# Patient Record
Sex: FEMALE
[De-identification: names, ages, dates, MRNs, and addresses within clinical notes are randomized per-mention and may not be internally consistent; named-entity substitution may affect disease eponyms.]

---

## 2019-03-12 ENCOUNTER — HOSPITAL ENCOUNTER (OUTPATIENT)
Dept: HOSPITAL 5 - CT | Age: 59
Discharge: HOME | End: 2019-03-12
Attending: INTERNAL MEDICINE
Payer: COMMERCIAL

## 2019-03-12 DIAGNOSIS — I70.0: ICD-10-CM

## 2019-03-12 DIAGNOSIS — K76.0: ICD-10-CM

## 2019-03-12 DIAGNOSIS — I71.4: Primary | ICD-10-CM

## 2019-03-12 LAB — BUN SERPL-MCNC: 11 MG/DL (ref 7–17)

## 2019-03-12 PROCEDURE — 74174 CTA ABD&PLVS W/CONTRAST: CPT

## 2019-03-12 PROCEDURE — 36415 COLL VENOUS BLD VENIPUNCTURE: CPT

## 2019-03-12 PROCEDURE — 82565 ASSAY OF CREATININE: CPT

## 2019-03-12 PROCEDURE — 84520 ASSAY OF UREA NITROGEN: CPT

## 2019-03-13 NOTE — CAT SCAN REPORT
PROCEDURE: CT ANGIO ABDOMEN PELVIS 

 

TECHNIQUE:  Computerized axial tomographic angiography of the abdomen and pelvis was performed after 
the IV injection of iodinated nonionic contrast. The image data was postprocessed using 2-dimensional
 multiplanar reformatted (MPR) and 3-dimensional (MIP and/or volume rendered) techniques.   

 

CT DOSE LENGTH PRODUCT:  702  mGycm 

 

HISTORY:  ABDOMINAL AORTIC ANEURYSM, WITHOUT RUPTURE 

 

COMPARISONS: None . 

 

FINDINGS: 

 

Abdominal aorta:  There is an infrarenal abdominal aortic aneurysm measuring up to 3.4 cm. Slight per
ipheral thrombus is identified. Moderate atherosclerosis along the periphery is seen. . 

Celiac artery: Normal . 

Superior mesenteric artery: Normal . 

Left renal artery: Normal . 

Right renal artery: Normal . 

Inferior mesenteric artery: Normal . 

Common iliacs:  Normal . 

External iliacs:  Normal . 

Internal iliacs:  Normal . 

Hypervascular masses:  None . 

Abdominal and pelvic viscera:  The liver is fatty infiltrated. There is distention of the gallbladder
 lumen. . 

Other: None . 

 

IMPRESSION:  There is a 3.4 cm infrarenal abdominal aortic aneurysm as described. Moderate atheroscle
rosis of the arterial structures is noted. 

 

The liver is fatty infiltrated. . 

 

 

This document is electronically signed by Gayatri Hendrickson DO., March 13 2019 04:24:50 AM ET